# Patient Record
Sex: MALE | Race: WHITE | Employment: OTHER | URBAN - METROPOLITAN AREA
[De-identification: names, ages, dates, MRNs, and addresses within clinical notes are randomized per-mention and may not be internally consistent; named-entity substitution may affect disease eponyms.]

---

## 2017-05-12 ENCOUNTER — HOSPITAL ENCOUNTER (EMERGENCY)
Facility: CLINIC | Age: 32
Discharge: HOME | End: 2017-05-12
Attending: EMERGENCY MEDICINE | Admitting: EMERGENCY MEDICINE
Payer: MEDICARE

## 2017-05-12 VITALS
TEMPERATURE: 97.4 F | DIASTOLIC BLOOD PRESSURE: 93 MMHG | HEART RATE: 125 BPM | RESPIRATION RATE: 22 BRPM | SYSTOLIC BLOOD PRESSURE: 145 MMHG | OXYGEN SATURATION: 96 %

## 2017-05-12 DIAGNOSIS — H61.91 LESION OF RIGHT EXTERNAL EAR: ICD-10-CM

## 2017-05-12 DIAGNOSIS — L03.113 CELLULITIS OF RIGHT ARM: ICD-10-CM

## 2017-05-12 PROCEDURE — 99282 EMERGENCY DEPT VISIT SF MDM: CPT

## 2017-05-12 PROCEDURE — 00000146 ZZHCL STATISTIC GLUCOSE BY METER IP

## 2017-05-12 RX ORDER — SULFAMETHOXAZOLE/TRIMETHOPRIM 800-160 MG
1 TABLET ORAL 2 TIMES DAILY
Qty: 14 TABLET | Refills: 0 | Status: SHIPPED | OUTPATIENT
Start: 2017-05-12 | End: 2017-05-19

## 2017-05-12 RX ORDER — CEPHALEXIN 500 MG/1
500 CAPSULE ORAL 4 TIMES DAILY
Qty: 28 CAPSULE | Refills: 0 | Status: SHIPPED | OUTPATIENT
Start: 2017-05-12 | End: 2017-05-19

## 2017-05-12 ASSESSMENT — ENCOUNTER SYMPTOMS: WOUND: 1

## 2017-05-12 NOTE — ED AVS SNAPSHOT
[facility] Emergency Department    [address] [place] [address]    Phone:  [phone]    Fax:  [phone]                                       Rob Brady   MRN: [id number]    Department:  [facility] Emergency Department   Date of Visit:  5/12/2017           Patient Information     Date Of Birth          1985        Your diagnoses for this visit were:     Cellulitis of right arm     Lesion of right external ear        You were seen by Pamela Chicas MD.      Follow-up Information     Follow up with Clinic or emergency department.    Why:  If ear bump is not improving on antibiotics or if symptoms worsen        Discharge Instructions         Discharge Instructions  Cellulitis    Cellulitis is an infection of the skin that occurs when bacteria enter the skin.   Symptoms are generally redness, swelling, warmth and pain.  Your infection appeared to be appropriate to treat at home with antibiotics.  However, sometimes your infection may be worse than it seemed at first, or may worsen with time. If you have new or worse symptoms, you may need to be seen again in the Emergency Department or by your primary doctor.    Return to the Emergency Department if:    The redness, pain, or swelling gets a lot worse.  If the red area was marked, return if it is red beyond the marked area.    You are unable to get your antibiotics, or are vomiting them up or you can t take them.    You are feeling more ill, weak or lightheaded.    You start to run a new fever (temperature >101).    Anything else about the infection worries or concerns you.  Treatment:    Start your antibiotics right away, and take them as prescribed. Be sure to finish the whole prescription, even if you are better.    Apply a heating pad, warm packs, or warm water soaks to the infected area for 15 minutes at a time, at least 3 times a day. Do not use a heating pad on your feet or legs if you have  "diabetes. Do not sleep with a heating pad on, since this can cause burns or skin injury.    Rest your injured area for at least 1-2 days. After that you may start using your extremity again as long as there is not too much pain.     Raise the injured area above the level of your heart as much as possible in the first 1-2 days.    Tylenol  (acetaminophen), Motrin  (ibuprofen), or Advil  (ibuprofen) may help may help reduce pain and fever and may help you feel more comfortable. Be sure to read and follow the package directions, and ask your doctor if you have questions.    Follow-up with your doctor:    Re-check in clinic within 2-3 days.  Probiotics: If you have been given an antibiotic, you may want to also take a probiotic pill or eat yogurt with live cultures. Probiotics have \"good bacteria\" to help your intestines stay healthy. Studies have shown that probiotics help prevent diarrhea and other intestine problems (including C. diff infection) when you take antibiotics. You can buy these without a prescription in the pharmacy section of the store.     If you were given a prescription for medicine here today, be sure to read all of the information (including the package insert) that comes with your prescription.  This will include important information about the medicine, its side effects, and any warnings that you need to know about.  The pharmacist who fills the prescription can provide more information and answer questions you may have about the medicine.  If you have questions or concerns that the pharmacist cannot address, please call or return to the Emergency Department.       Remember that you can always come back to the Emergency Department if you are not able to see your regular doctor in the amount of time listed above, if you get any new symptoms, or if there is anything that worries you.        24 Hour Appointment Hotline       To make an appointment at any [facility], call [phone] ([phone]). If you don't have a family doctor or clinic, we will help you find one. [facility] clinics are conveniently located to serve the needs of you and your family.             Review of your medicines      START taking        Dose / Directions Last dose taken    cephALEXin 500 MG capsule   Commonly known as:  KEFLEX   Dose:  500 mg   Quantity:  28 capsule        Take 1 capsule (500 mg) by mouth 4 times daily for 7 days   Refills:  0        sulfamethoxazole-trimethoprim 800-160 MG per tablet   Commonly known as:  BACTRIM DS   Dose:  1 tablet   Quantity:  14 tablet        Take 1 tablet by mouth 2 times daily for 7 days   Refills:  0          Our records show that you are taking the medicines listed below. If these are incorrect, please call your family doctor or clinic.        Dose / Directions Last dose taken    ABILIFY 15 MG tablet   Dose:  15 mg   Generic drug:  ARIPiprazole        Take 15 mg by mouth daily.   Refills:  0        ACT FLUORIDE MT        Take  by mouth 2 times daily. Rinse Mouth Twice Daily.   Refills:  0        benztropine 0.5 MG tablet   Commonly known as:  COGENTIN   Dose:  0.5 mg        Take 0.5 mg by mouth 2 times daily.   Refills:  0        cholecalciferol 1000 UNITS Tabs   Dose:  2 tablet        Take 2 tablets by mouth daily.   Refills:  0        GLYCOLAX powder   Generic drug:  polyethylene glycol        as needed   Refills:  0        ketoconazole 2 % shampoo   Commonly known as:  NIZORAL   Quantity:  120 mL        Apply to the affected area and wash off after 5 minutes.   Refills:  0        metFORMIN 750 MG 24 hr tablet   Commonly known as:  GLUCOPHAGE-XR   Dose:  750 mg        Take 750 mg by mouth 2 times daily (before meals).   Refills:  0        TOPAMAX 200 MG tablet   Dose:  1 tablet   Generic drug:  topiramate        Take 1 tablet by mouth daily.   Refills:  0                Prescriptions were sent or printed at these locations (2 Prescriptions)                   Other  Prescriptions                Printed at Department/Unit printer (2 of 2)         cephALEXin (KEFLEX) 500 MG capsule               sulfamethoxazole-trimethoprim (BACTRIM DS) 800-160 MG per tablet                Orders Needing Specimen Collection     None      Pending Results     No orders found from 5/10/2017 to 5/13/2017.            Pending Culture Results     No orders found from 5/10/2017 to 5/13/2017.            Pending Results Instructions     If you had any lab results that were not finalized at the time of your Discharge, you can call the ED Lab Result RN at [phone]. You will be contacted by this team for any positive Lab results or changes in treatment. The nurses are available 7 days a week from 10A to 6:30P.  You can leave a message 24 hours per day and they will return your call.        Test Results From Your Hospital Stay               Clinical Quality Measure: Blood Pressure Screening     Your blood pressure was checked while you were in the emergency department today. The last reading we obtained was  BP: (!) 145/93 . Please read the guidelines below about what these numbers mean and what you should do about them.  If your systolic blood pressure (the top number) is less than 120 and your diastolic blood pressure (the bottom number) is less than 80, then your blood pressure is normal. There is nothing more that you need to do about it.  If your systolic blood pressure (the top number) is 120-139 or your diastolic blood pressure (the bottom number) is 80-89, your blood pressure may be higher than it should be. You should have your blood pressure rechecked within a year by a primary care provider.  If your systolic blood pressure (the top number) is 140 or greater or your diastolic blood pressure (the bottom number) is 90 or greater, you may have high blood pressure. High blood pressure is treatable, but if left untreated over time it can put you at risk for heart attack, stroke, or kidney failure.  "You should have your blood pressure rechecked by a primary care provider within the next 4 weeks.  If your provider in the emergency department today gave you specific instructions to follow-up with your doctor or provider even sooner than that, you should follow that instruction and not wait for up to 4 weeks for your follow-up visit.        Thank you for choosing [facility]       Thank you for choosing [facility] for your care. Our goal is always to provide you with excellent care. Hearing back from our patients is one way we can continue to improve our services. Please take a few minutes to complete the written survey that you may receive in the mail after you visit with us. Thank you!        [email or web address] Information     [employer] lets you send messages to your doctor, view your test results, renew your prescriptions, schedule appointments and more. To sign up, go to [email or web address] . Click on \"Log in\" on the left side of the screen, which will take you to the Welcome page. Then click on \"Sign up Now\" on the right side of the page.     You will be asked to enter the access code listed below, as well as some personal information. Please follow the directions to create your username and password.     Your access code is: [email or web address]  Expires: 8/10/2017  9:25 PM     Your access code will  in 90 days. If you need help or a new code, please call your [facility] clinic or [phone].        Care EveryWhere ID     This is your Care EveryWhere ID. This could be used by other organizations to access your [facility] medical records  [id number]        After Visit Summary       This is your record. Keep this with you and show to your community pharmacist(s) and doctor(s) at your next visit.                  "

## 2017-05-12 NOTE — ED AVS SNAPSHOT
[facility] Emergency Department    [address]    [facility] [address]    Phone:  [phone]    Fax:  [phone]                                       Rob Brady   MRN: [id number]    Department:  [facility] Emergency Department   Date of Visit:  5/12/2017           After Visit Summary Signature Page     I have received my discharge instructions, and my questions have been answered. I have discussed any challenges I see with this plan with the nurse or doctor.    ..........................................................................................................................................  Patient/Patient Representative Signature      ..........................................................................................................................................  Patient Representative Print Name and Relationship to Patient    ..................................................               ................................................  Date                                            Time    ..........................................................................................................................................  Reviewed by Signature/Title    ...................................................              ..............................................  Date                                                            Time

## 2017-05-13 LAB — GLUCOSE BLDC GLUCOMTR-MCNC: 125 MG/DL (ref 70–99)

## 2017-05-13 NOTE — ED PROVIDER NOTES
History     Chief Complaint:  Wound Infection    HPI   Rob Brady is a 31 year old male who presents to the emergency department today for evaluation of wound infection. The patient states he has a bump behind his right ear that is causing him some pain. Additionally, he has a wound on his right forearm that he notes he has been scratching at for a few weeks and thinks is infected.  No fevers.    Allergies:  No Known Drug Allergies     Medications:    Abilify  Buspar  Fluoxetine  Metformin  Omeprazole  Topamax  Trazodone  Ibuprofen  Glycolax  Prozac  Ambien  Ativan  Lexapro    Past Medical History:    ADD  Depressive disorder  Diabetes mellitus  OCD  Oppositional defiant disorder    Past Surgical History:    Dental surgery    Family History:    History reviewed. No pertinent family history.      Social History:  Smoking Status: former smoker   Alcohol Use: positive    Marital Status:  Single [1]     Review of Systems   Skin: Positive for wound.   All other systems reviewed and are negative.    Physical Exam   Vitals:  Patient Vitals for the past 24 hrs:   BP Temp Temp src Pulse Resp SpO2   05/12/17 2001 (!) 145/93 97.4  F (36.3  C) Oral 125 22 96 %      Physical Exam  Eyes:  Sclera white; Pupils are equal and round  ENT:   External ears and nares normal  CV:  Rate as above with regular rhythm   Resp:  Breath sounds clear and equal bilaterally  MS:  Moves all extremities  Skin:  Warm and dry  Small tender bump at reflection of posterior R ear and scalp, no cellulitis  Open wound R forearm w/surrounding cellulitis  Neuro: Speech is normal and fluent.    Emergency Department Course      Emergency Department Course:  Nursing notes and vitals reviewed.  I performed an exam of the patient as documented above.   I discussed the treatment plan with the patient. They expressed understanding of this plan and consented to discharge. They will be discharged home with instructions for care and follow up. In addition, the  patient will return to the emergency department if their symptoms persist, worsen, if new symptoms arise or if there is any concern.  All questions were answered.   I personally reviewed the results with the patient and answered all related questions prior to discharge.    Impression & Plan      Medical Decision Making:  The patient presents here for evaluation of two areas of concern on the skin. The right forearm is consistent with cellulitis around an open wound. There is no fluctuance to suggest abscess formation. Behind the right ear there is a small tender bump. This is mobile and not draining. There is no overlying evidence of cellulitis. This area is very small but may be early abscess. Based on small size, I think antibiotic treatment is appropriate and monitoring for enlarging or failure to resolve for which he should return immediately.  He has a history of diabetes and glucose is normal.    Diagnosis:    ICD-10-CM    1. Cellulitis of right arm L03.113    2. Lesion of right external ear H61.91      Disposition:   Discharge    Discharge Medications:  New Prescriptions    CEPHALEXIN (KEFLEX) 500 MG CAPSULE    Take 1 capsule (500 mg) by mouth 4 times daily for 7 days    SULFAMETHOXAZOLE-TRIMETHOPRIM (BACTRIM DS) 800-160 MG PER TABLET    Take 1 tablet by mouth 2 times daily for 7 days     Scribe Disclosure:  I, Sudarshan Wright, am serving as a scribe at 8:12 PM on 5/12/2017 to document services personally performed by Pamela Chicas, *, based on my observations and the provider's statements to me.   5/12/2017   [facility] EMERGENCY DEPARTMENT       Pamela Chicas MD  05/13/17 2761

## 2017-05-13 NOTE — DISCHARGE INSTRUCTIONS
Discharge Instructions  Cellulitis    Cellulitis is an infection of the skin that occurs when bacteria enter the skin.   Symptoms are generally redness, swelling, warmth and pain.  Your infection appeared to be appropriate to treat at home with antibiotics.  However, sometimes your infection may be worse than it seemed at first, or may worsen with time. If you have new or worse symptoms, you may need to be seen again in the Emergency Department or by your primary doctor.    Return to the Emergency Department if:    The redness, pain, or swelling gets a lot worse.  If the red area was marked, return if it is red beyond the marked area.    You are unable to get your antibiotics, or are vomiting them up or you can t take them.    You are feeling more ill, weak or lightheaded.    You start to run a new fever (temperature >101).    Anything else about the infection worries or concerns you.  Treatment:    Start your antibiotics right away, and take them as prescribed. Be sure to finish the whole prescription, even if you are better.    Apply a heating pad, warm packs, or warm water soaks to the infected area for 15 minutes at a time, at least 3 times a day. Do not use a heating pad on your feet or legs if you have diabetes. Do not sleep with a heating pad on, since this can cause burns or skin injury.    Rest your injured area for at least 1-2 days. After that you may start using your extremity again as long as there is not too much pain.     Raise the injured area above the level of your heart as much as possible in the first 1-2 days.    Tylenol  (acetaminophen), Motrin  (ibuprofen), or Advil  (ibuprofen) may help may help reduce pain and fever and may help you feel more comfortable. Be sure to read and follow the package directions, and ask your doctor if you have questions.    Follow-up with your doctor:    Re-check in clinic within 2-3 days.  Probiotics: If you have been given an antibiotic, you may want to also  "take a probiotic pill or eat yogurt with live cultures. Probiotics have \"good bacteria\" to help your intestines stay healthy. Studies have shown that probiotics help prevent diarrhea and other intestine problems (including C. diff infection) when you take antibiotics. You can buy these without a prescription in the pharmacy section of the store.     If you were given a prescription for medicine here today, be sure to read all of the information (including the package insert) that comes with your prescription.  This will include important information about the medicine, its side effects, and any warnings that you need to know about.  The pharmacist who fills the prescription can provide more information and answer questions you may have about the medicine.  If you have questions or concerns that the pharmacist cannot address, please call or return to the Emergency Department.       Remember that you can always come back to the Emergency Department if you are not able to see your regular doctor in the amount of time listed above, if you get any new symptoms, or if there is anything that worries you.      "

## 2017-05-13 NOTE — ED NOTES
Pt complains of a bump behind his right ear that hurts if he pushes on it.  Pt states he is a  due to his OCD and he has a wound on his right forearm that he thinks is infected.    Airway, breathing and circulation intact without need for intervention. Alert and interacting appropriately for age and situation.    HOME MEDICATIONS:  List in EPIC is NOT correct: verified as able, but pt does not know remainder of his meds. Goes to [facility] for medical care.

## 2017-10-01 ENCOUNTER — APPOINTMENT (OUTPATIENT)
Dept: GENERAL RADIOLOGY | Facility: CLINIC | Age: 32
End: 2017-10-01
Attending: EMERGENCY MEDICINE
Payer: MEDICARE

## 2017-10-01 ENCOUNTER — HOSPITAL ENCOUNTER (EMERGENCY)
Facility: CLINIC | Age: 32
Discharge: HOME | End: 2017-10-01
Attending: EMERGENCY MEDICINE | Admitting: EMERGENCY MEDICINE
Payer: MEDICARE

## 2017-10-01 VITALS
DIASTOLIC BLOOD PRESSURE: 83 MMHG | OXYGEN SATURATION: 94 % | RESPIRATION RATE: 20 BRPM | HEART RATE: 94 BPM | SYSTOLIC BLOOD PRESSURE: 122 MMHG | TEMPERATURE: 97.8 F | BODY MASS INDEX: 54.93 KG/M2 | WEIGHT: 315 LBS

## 2017-10-01 DIAGNOSIS — R05.9 COUGH: ICD-10-CM

## 2017-10-01 DIAGNOSIS — H66.91 RIGHT ACUTE OTITIS MEDIA: ICD-10-CM

## 2017-10-01 DIAGNOSIS — J06.9 UPPER RESPIRATORY TRACT INFECTION, UNSPECIFIED TYPE: ICD-10-CM

## 2017-10-01 PROCEDURE — 71020 XR CHEST 2 VW: CPT

## 2017-10-01 PROCEDURE — 99283 EMERGENCY DEPT VISIT LOW MDM: CPT | Mod: 25

## 2017-10-01 RX ORDER — AMOXICILLIN 500 MG/1
1000 CAPSULE ORAL 2 TIMES DAILY
Qty: 40 CAPSULE | Refills: 0 | Status: SHIPPED | OUTPATIENT
Start: 2017-10-01 | End: 2017-10-11

## 2017-10-01 NOTE — ED AVS SNAPSHOT
[facility] Emergency Department    [address]    [facility] [address]    Phone:  [phone]    Fax:  [phone]                                       Rob Brady   MRN: [id number]    Department:  [facility] Emergency Department   Date of Visit:  10/1/2017           Patient Information     Date Of Birth          1985        Your diagnoses for this visit were:     Right acute otitis media     Cough     Upper respiratory tract infection, unspecified type        You were seen by Mateo Clark MD.      Follow-up Information     Follow up with Sudarshan Jack MD.    Specialty:  Family Practice    Contact information:    [facility]  [address] [place] [id number] [phone]          Follow up with [facility] Emergency Department.    Specialty:  EMERGENCY MEDICINE    Why:  If symptoms worsen    Contact information:    [address]  [facility] [address]  [phone]        Discharge Instructions         Discharge Instructions  Otitis Media  You or your child have an ear infection known as acute otitis media, or middle ear infection (otitis = ear, media = middle). These infections often develop after a virus infection, such as a cold. The cold causes swelling around the pressure-equalizing tube of the ear, which allows fluid to build up in the space behind the eardrum (the middle ear). This fluid build-up can trap bacteria and viruses and increase pressure on the eardrum causing pain.  Return to the Emergency Department if:    You or your child are not better within 24-48 hours.    Your child becomes very fussy or weak.    Your child is showing signs of dehydration, such as less than 3 wet diapers per day.    Your symptoms get worse, or if you develop a severe headache, stiff neck, or new symptoms.    You have signs of allergic reaction to medicine. These include rash, lip swelling, difficulty breathing, wheezing, and  "dizziness.    Ear infection symptoms:     Symptoms of an ear infection can include ear aching or pain and temporary hearing loss. These symptoms often come on suddenly.    Ear infection symptoms (infants / young children):    Fever (temperature greater than 100.4 F or 38 C).    Pulling on the ear.    Fussiness.    Decreased activity.    Lack of appetite or difficulty eating.    Vomiting or diarrhea.    Treatment:     The \"best\" treatment depends on your age, history of previous infections, and any underlying medical problems.    Antibiotics are not given to every patient with an ear infection because studies show that many people with ear infections will improve without using antibiotics. Because antibiotics can have side effects such as diarrhea and stomach upset and can also cause severe allergic reactions, doctors are trying to avoid using antibiotics if it is safe for the patient to do so.   In these cases, a prescription for antibiotics may be given to be filled in 24 -48 hours if symptoms are getting worse or not improving. If the symptoms are improving, the antibiotic does not need to be taken.     Remember, antibiotics do not treat pain.      Pain medications. You may take a pain medication such as Tylenol  (acetaminophen), Advil  (ibuprofen), Nuprin  (ibuprofen) or Aleve  (naproxen).  If you have been given a narcotic such as Vicodin  (hydrocodone with acetaminophen), Percocet  (oxycodone with acetaminophen), or codeine, do not drive for four hours after you have taken it. If the narcotic contains Tylenol  (acetaminophen), do not take Tylenol  with it. All narcotics will cause constipation, so eat a high fiber diet.      Pain treatment options also include ear drops such as Auralgan  (antipyrine/benzocaine/u-polycosanol), which contains a topical numbing medicine.     Do not take a medication if you have a known allergy to that medication.  Probiotics: If you have been given an antibiotic, you may want to " "also take a probiotic pill or eat yogurt with live cultures. Probiotics have \"good bacteria\" to help your intestines stay healthy. Studies have shown that probiotics help prevent diarrhea and other intestine problems (including C. diff infection) when you take antibiotics. You can buy these without a prescription in the pharmacy section of the store.   Complications:      Tympanic membrane rupture - One possible complication of an ear infection is rupture of the tympanic membrane, or ear drum. This happens because of pressure on the tympanic membrane from the infected fluid. When the tympanic membrane ruptures, you may have pus or blood drain from the ear. It does not hurt when the membrane ruptures, and many people actually feel better because pressure is released. Fortunately, the tympanic membrane usually heals quickly after rupturing, within hours to days. You should keep water out of the ear until you re-check with your doctor to be sure the ear drum has healed.       Mastoiditis - Rarely, the area behind the ear can become infected, this area is called the mastoid.  If you notice redness and swelling behind your ear, see your physician or return to the Emergency Department immediately.        Hearing loss - The fluid that collects behind the eardrum (called an effusion) can persist for weeks to months after the pain of an ear infection resolves. An effusion causes trouble hearing, which is usually temporary. If the fluid persists, however, it can interfere with the process of learning to speak.   For this reason, children under 2 need to be seen by their pediatrician WITHIN 3 MONTHS to ensure that the fluid has been reabsorbed.  If you were given a prescription for medicine here today, be sure to read all of the information (including the package insert) that comes with your prescription.  This will include important information about the medicine, its side effects, and any warnings that you need to know " about.  The pharmacist who fills the prescription can provide more information and answer questions you may have about the medicine.  If you have questions or concerns that the pharmacist cannot address, please call or return to the Emergency Department.   Opioid Medication Information    Pain medications are among the most commonly prescribed medicines, so we are including this information for all our patients. If you did not receive pain medication or get a prescription for pain medicine, you can ignore it.     You may have been given a prescription for an opioid (narcotic) pain medicine and/or have received a pain medicine while here in the Emergency Department. These medicines can make you drowsy or impaired. You must not drive, operate dangerous equipment, or engage in any other dangerous activities while taking these medications. If you drive while taking these medications, you could be arrested for DUI, or driving under the influence. Do not drink any alcohol while you are taking these medications.     Opioid pain medications can cause addiction. If you have a history of chemical dependency of any type, you are at a higher risk of becoming addicted to pain medications.  Only take these prescribed medications to treat your pain when all other options have been tried. Take it for as short a time and as few doses as possible. Store your pain pills in a secure place, as they are frequently stolen and provide a dangerous opportunity for children or visitors in your house to start abusing these powerful medications. We will not replace any lost or stolen medicine.  As soon as your pain is better, you should flush all your remaining medication.     Many prescription pain medications contain Tylenol  (acetaminophen), including Vicodin , Tylenol #3 , Norco , Lortab , and Percocet .  You should not take any extra pills of Tylenol  if you are using these prescription medications or you can get very sick.  Do not ever  take more than 3000 mg of acetaminophen in any 24 hour period.    All opioids tend to cause constipation. Drink plenty of water and eat foods that have a lot of fiber, such as fruits, vegetables, prune juice, apple juice and high fiber cereal.  Take a laxative if you don t move your bowels at least every other day. Miralax , Milk of Magnesia, Colace , or Senna  can be used to keep you regular.      Remember that you can always come back to the Emergency Department if you are not able to see your regular doctor in the amount of time listed above, if you get any new symptoms, or if there is anything that worries you.        24 Hour Appointment Hotline       To make an appointment at any [facility], call [phone] ([phone]). If you don't have a family doctor or clinic, we will help you find one. [facility] are conveniently located to serve the needs of you and your family.             Review of your medicines      START taking        Dose / Directions Last dose taken    amoxicillin 500 MG capsule   Commonly known as:  AMOXIL   Dose:  1000 mg   Quantity:  40 capsule        Take 2 capsules (1,000 mg) by mouth 2 times daily for 10 days   Refills:  0          Our records show that you are taking the medicines listed below. If these are incorrect, please call your family doctor or clinic.        Dose / Directions Last dose taken    ABILIFY 15 MG tablet   Dose:  15 mg   Generic drug:  ARIPiprazole        Take 15 mg by mouth daily.   Refills:  0        ACT FLUORIDE MT        Take  by mouth 2 times daily. Rinse Mouth Twice Daily.   Refills:  0        benztropine 0.5 MG tablet   Commonly known as:  COGENTIN   Dose:  0.5 mg        Take 0.5 mg by mouth 2 times daily.   Refills:  0        cholecalciferol 1000 UNITS Tabs   Dose:  2 tablet        Take 2 tablets by mouth daily.   Refills:  0        GLYCOLAX powder   Generic drug:  polyethylene glycol        as needed   Refills:  0        ketoconazole 2 %  shampoo   Commonly known as:  NIZORAL   Quantity:  120 mL        Apply to the affected area and wash off after 5 minutes.   Refills:  0        metFORMIN 750 MG 24 hr tablet   Commonly known as:  GLUCOPHAGE-XR   Dose:  750 mg        Take 750 mg by mouth 2 times daily (before meals).   Refills:  0        TOPAMAX 200 MG tablet   Dose:  1 tablet   Generic drug:  topiramate        Take 1 tablet by mouth daily.   Refills:  0                Prescriptions were sent or printed at these locations (1 Prescription)                   Other Prescriptions                Printed at Department/Unit printer (1 of 1)         amoxicillin (AMOXIL) 500 MG capsule                Procedures and tests performed during your visit     Chest XR,  PA & LAT      Orders Needing Specimen Collection     None      Pending Results     No orders found from 9/29/2017 to 10/2/2017.            Pending Culture Results     No orders found from 9/29/2017 to 10/2/2017.            Pending Results Instructions     If you had any lab results that were not finalized at the time of your Discharge, you can call the ED Lab Result RN at [phone]. You will be contacted by this team for any positive Lab results or changes in treatment. The nurses are available 7 days a week from 10A to 6:30P.  You can leave a message 24 hours per day and they will return your call.        Test Results From Your Hospital Stay        10/1/2017  4:14 PM      Narrative     CHEST TWO VIEWS    10/1/2017 4:02 PM     HISTORY: Cough.    COMPARISON: 11/16/2012.    FINDINGS: Negative chest. No interval change.        Impression     IMPRESSION: Negative.     KIRSTIN TOMLINSON MD                Clinical Quality Measure: Blood Pressure Screening     Your blood pressure was checked while you were in the emergency department today. The last reading we obtained was  BP: 124/73 . Please read the guidelines below about what these numbers mean and what you should do about them.  If your systolic blood  "pressure (the top number) is less than 120 and your diastolic blood pressure (the bottom number) is less than 80, then your blood pressure is normal. There is nothing more that you need to do about it.  If your systolic blood pressure (the top number) is 120-139 or your diastolic blood pressure (the bottom number) is 80-89, your blood pressure may be higher than it should be. You should have your blood pressure rechecked within a year by a primary care provider.  If your systolic blood pressure (the top number) is 140 or greater or your diastolic blood pressure (the bottom number) is 90 or greater, you may have high blood pressure. High blood pressure is treatable, but if left untreated over time it can put you at risk for heart attack, stroke, or kidney failure. You should have your blood pressure rechecked by a primary care provider within the next 4 weeks.  If your provider in the emergency department today gave you specific instructions to follow-up with your doctor or provider even sooner than that, you should follow that instruction and not wait for up to 4 weeks for your follow-up visit.        Thank you for choosing [facility]       Thank you for choosing [facility] for your care. Our goal is always to provide you with excellent care. Hearing back from our patients is one way we can continue to improve our services. Please take a few minutes to complete the written survey that you may receive in the mail after you visit with us. Thank you!        [email or web address] Information     [employer] lets you send messages to your doctor, view your test results, renew your prescriptions, schedule appointments and more. To sign up, go to [email or web address] . Click on \"Log in\" on the left side of the screen, which will take you to the Welcome page. Then click on \"Sign up Now\" on the right side of the page.     You will be asked to enter the access code listed below, as well as some personal information. Please follow the " directions to create your username and password.     Your access code is: [email or web address]  Expires: 2017  4:32 PM     Your access code will  in 90 days. If you need help or a new code, please call your [facility] clinic or [phone].        Care EveryWhere ID     This is your Care EveryWhere ID. This could be used by other organizations to access your [facility] medical records  [id number]        Equal Access to Services     [facility] : Hadii aad ku hadasho Soarchie, waaxda luqadaha, qaybta kaalmada adeegyada, jayne monzon . So [facility] [phone].    ATENCIÓN: Si habla español, tiene a angeles disposición servicios gratuitos de asistencia lingüística. Llame al [phone].    We comply with applicable federal civil rights laws and [place] laws. We do not discriminate on the basis of race, color, national origin, age, disability, sex, sexual orientation, or gender identity.            After Visit Summary       This is your record. Keep this with you and show to your community pharmacist(s) and doctor(s) at your next visit.

## 2017-10-01 NOTE — ED PROVIDER NOTES
History     Chief Complaint:  Nasal Congestion and Cough       HPI:  Rob Brady is a 32 year old male who presents with cough and nasal congestion. Patient notes symptoms having been present for 2 weeks. Cough has been productive of sputum. Associated symptoms include rhinorrhea, nasal congestion and right ear pain. He denies any known sick contacts. No travel outside the state or country. Patient is a former smoker. No history of underlying asthma or other pulmonary disease. Denies abdominal pain, nausea, vomiting, or diarrhea. He has been eating and drinking well. He presents alongside his mother. No other concerns or voiced at this time.    Allergies:  Nkda [No Known Drug Allergies]     Medications:      amoxicillin (AMOXIL) 500 MG capsule   ketoconazole (NIZORAL) 2 % shampoo   cholecalciferol 1000 UNITS TABS   ARIPiprazole (ABILIFY) 15 MG tablet   benztropine (COGENTIN) 0.5 MG tablet   MetFORmin (GLUCOPHAGE-XR) 750 MG 24 hr tablet   ACT FLUORIDE MT   GLYCOLAX OR POWD   TOPAMAX 200 MG OR TABS       Past Medical History:    Past Medical History:   Diagnosis Date     ADD (attention deficit disorder)      Depressive disorder      Diabetes mellitus (H)      OCD (obsessive compulsive disorder)      Oppositional defiant disorder      Patient Active Problem List    Diagnosis Date Noted     Depression 10/09/2012     Priority: Medium        Past Surgical History:    Past Surgical History:   Procedure Laterality Date     BIOPSY       DENTAL SURGERY  2006        Family History:    family history is not on file.    Social History:   reports that he quit smoking about 11 years ago. He has never used smokeless tobacco. He reports that he drinks alcohol. He reports that he does not use illicit drugs.    Review of Systems:  10 point ROS is negative aside from that mentioned in the HPI      Physical Exam     Patient Vitals for the past 24 hrs:   BP Temp Temp src Pulse Resp SpO2 Weight   10/01/17 1617 - - - 94 - 95 % -    10/01/17 1530 124/73 - - - - 94 % -   10/01/17 1515 - - - - - 94 % -   10/01/17 1505 131/78 97.8  F (36.6  C) Oral 106 20 96 % (!) 145.2 kg (320 lb)   10/01/17 1500 - - - - - 94 % -      General:   Well-nourished   Speaking in full sentences  Eyes:   Conjunctiva without injection or scleral icterus   PERRL  ENT:   Moist mucous membranes   Posterior oropharynx clear without erythema or exudate   Nasal congestion appreciated   Dried rhinorrhea   TM on R with obscuration of landmarks, purulent fluid behind TM, light reflex blunted   L TM with erythema though landmarks visualized   Nares patent   Pinnae normal  Neck:   Full ROM   No stiffness appreciated  Resp:   Lungs CTAB   No crackles, wheezing or audible rubs   Good air movement  CV:    Tachycardic rate, regular rhythm   S1 and S2 present   No murmur, gallop or rub  GI:   BS present   Abdomen soft without distention   Non-tender to light and deep palpation   No guarding or rebound tenderness  Skin:   Warm, dry, well perfused   No rashes or open wounds on exposed skin  MSK:   Moves all extremities   No focal deformities or swelling  Neuro:   Alert   Answers questions appropriately   Moves all extremities equally   Gait stable  Psych:   Normal affect, normal mood      Emergency Department Course     Imaging:  Radiology findings were communicated with the patient who voiced understanding of the findings.  Chest XR,  PA & LAT   Final Result   IMPRESSION: Negative.       KIRSTIN TOMLINSON MD         Procedures:  Cerumen disempaction    Interventions:  Medications - No data to display     Emergency Department Course:  Nursing notes and vitals reviewed.  3:10 PM: I performed an exam of the patient as documented above.      IV was inserted and blood was drawn for laboratory testing, results above.    The patient was sent for X-ray while in the emergency department, results above.      3:59 PM:  Patient re-evaluated.     4:32 PMI discussed the treatment plan with the  patient and mother. They expressed understanding of this plan and consented to discharge. They will be discharged home with instructions for care and follow up. In addition, the patient will return to the emergency department if their symptoms persist, worsen, if new symptoms arise or if there is any concern.  All questions were answered.    I personally reviewed the imaging and laboratory results with the Patient and answered all related questions prior to discharge.    Impression & Plan    Medical Decision Making:  Rob Brady is a 32 year old male presenting to the ER for evaluation of otalgia.  VS on presentation reveal elevated BP and HR, which improved during ED course.  History and examination is consistent with right otitis media.  There is no appreciable swelling of the external canal, drainage, nor pain with manipulation of the pinnae to suggest otitis externa.  No erythema or tenderness to palpation of the mastoid process to suggest mastoiditis.  Patient is awake, alert and history and exam is not consistent with acute meningitis/encephalitis.  He notes associated cough, productive of sputum, though CXR negative for pneumonia.  Additionally, there is no current evidence to suggest deep space neck infection such as peritonsillar abscess, retropharyngeal abscess, soft tissue abscess, epiglottitis, nor Julius's angina. On exam, he does not appear clinically dehydrated to warrant IVF.      Based on the above history, examination, and ED course, I feel patient is safe for DC home with close outpatient follow-up.  Patient will be started on Amoxicillin for treatment.  They were encouraged to use acetaminophen or ibuprofen for pain or fever.  I have recommended follow-up with their primary care provider in 3 days if symptoms are not improving.  They were welcomed to return to the ER with worsening pain, changes in behavior, vomiting, temperature >102F, or any other new or troubling symptoms.  Questions were  answered prior to discharge.      Diagnosis:    ICD-10-CM    1. Right acute otitis media H66.91    2. Cough R05    3. Upper respiratory tract infection, unspecified type J06.9         Discharge Medications:  New Prescriptions    AMOXICILLIN (AMOXIL) 500 MG CAPSULE    Take 2 capsules (1,000 mg) by mouth 2 times daily for 10 days        10/1/2017   Mateo Clark MD Roach, Brian Donald, MD  10/01/17 1633

## 2017-10-01 NOTE — ED AVS SNAPSHOT
[facility] Emergency Department    [address]    [facility] [address]    Phone:  [phone]    Fax:  [phone]                                       Rob Brady   MRN: [id number]    Department:  [facility] Emergency Department   Date of Visit:  10/1/2017           After Visit Summary Signature Page     I have received my discharge instructions, and my questions have been answered. I have discussed any challenges I see with this plan with the nurse or doctor.    ..........................................................................................................................................  Patient/Patient Representative Signature      ..........................................................................................................................................  Patient Representative Print Name and Relationship to Patient    ..................................................               ................................................  Date                                            Time    ..........................................................................................................................................  Reviewed by Signature/Title    ...................................................              ..............................................  Date                                                            Time

## 2019-01-12 ENCOUNTER — APPOINTMENT (OUTPATIENT)
Dept: GENERAL RADIOLOGY | Facility: CLINIC | Age: 34
End: 2019-01-12
Payer: MEDICARE

## 2019-01-12 ENCOUNTER — HOSPITAL ENCOUNTER (EMERGENCY)
Facility: CLINIC | Age: 34
Discharge: HOME | End: 2019-01-12
Attending: EMERGENCY MEDICINE | Admitting: EMERGENCY MEDICINE
Payer: MEDICARE

## 2019-01-12 VITALS
OXYGEN SATURATION: 99 % | RESPIRATION RATE: 18 BRPM | HEART RATE: 99 BPM | WEIGHT: 315 LBS | DIASTOLIC BLOOD PRESSURE: 70 MMHG | SYSTOLIC BLOOD PRESSURE: 132 MMHG | TEMPERATURE: 98.2 F | BODY MASS INDEX: 54.93 KG/M2

## 2019-01-12 DIAGNOSIS — R07.9 ACUTE CHEST PAIN: ICD-10-CM

## 2019-01-12 LAB
ANION GAP SERPL CALCULATED.3IONS-SCNC: 8 MMOL/L (ref 3–14)
BASOPHILS # BLD AUTO: 0.1 10E9/L (ref 0–0.2)
BASOPHILS NFR BLD AUTO: 0.8 %
BUN SERPL-MCNC: 11 MG/DL (ref 7–30)
CALCIUM SERPL-MCNC: 8 MG/DL (ref 8.5–10.1)
CHLORIDE SERPL-SCNC: 111 MMOL/L (ref 94–109)
CO2 SERPL-SCNC: 24 MMOL/L (ref 20–32)
CREAT SERPL-MCNC: 0.92 MG/DL (ref 0.66–1.25)
D DIMER PPP FEU-MCNC: 0.3 UG/ML FEU (ref 0–0.5)
DIFFERENTIAL METHOD BLD: ABNORMAL
EOSINOPHIL # BLD AUTO: 0.4 10E9/L (ref 0–0.7)
EOSINOPHIL NFR BLD AUTO: 3.1 %
ERYTHROCYTE [DISTWIDTH] IN BLOOD BY AUTOMATED COUNT: 13.2 % (ref 10–15)
GFR SERPL CREATININE-BSD FRML MDRD: >90 ML/MIN/{1.73_M2}
GLUCOSE SERPL-MCNC: 137 MG/DL (ref 70–99)
HCT VFR BLD AUTO: 42.1 % (ref 40–53)
HGB BLD-MCNC: 13.8 G/DL (ref 13.3–17.7)
IMM GRANULOCYTES # BLD: 0.1 10E9/L (ref 0–0.4)
IMM GRANULOCYTES NFR BLD: 0.5 %
LYMPHOCYTES # BLD AUTO: 4 10E9/L (ref 0.8–5.3)
LYMPHOCYTES NFR BLD AUTO: 31.8 %
MCH RBC QN AUTO: 29.1 PG (ref 26.5–33)
MCHC RBC AUTO-ENTMCNC: 32.8 G/DL (ref 31.5–36.5)
MCV RBC AUTO: 89 FL (ref 78–100)
MONOCYTES # BLD AUTO: 1.2 10E9/L (ref 0–1.3)
MONOCYTES NFR BLD AUTO: 9.6 %
NEUTROPHILS # BLD AUTO: 6.7 10E9/L (ref 1.6–8.3)
NEUTROPHILS NFR BLD AUTO: 54.2 %
NRBC # BLD AUTO: 0 10*3/UL
NRBC BLD AUTO-RTO: 0 /100
PLATELET # BLD AUTO: 246 10E9/L (ref 150–450)
POTASSIUM SERPL-SCNC: 3.4 MMOL/L (ref 3.4–5.3)
RBC # BLD AUTO: 4.74 10E12/L (ref 4.4–5.9)
SODIUM SERPL-SCNC: 143 MMOL/L (ref 133–144)
TROPONIN I SERPL-MCNC: <0.015 UG/L (ref 0–0.04)
WBC # BLD AUTO: 12.4 10E9/L (ref 4–11)

## 2019-01-12 PROCEDURE — 93005 ELECTROCARDIOGRAM TRACING: CPT

## 2019-01-12 PROCEDURE — 25000128 H RX IP 250 OP 636: Performed by: EMERGENCY MEDICINE

## 2019-01-12 PROCEDURE — 85025 COMPLETE CBC W/AUTO DIFF WBC: CPT | Performed by: EMERGENCY MEDICINE

## 2019-01-12 PROCEDURE — 84484 ASSAY OF TROPONIN QUANT: CPT | Performed by: EMERGENCY MEDICINE

## 2019-01-12 PROCEDURE — 80048 BASIC METABOLIC PNL TOTAL CA: CPT | Performed by: EMERGENCY MEDICINE

## 2019-01-12 PROCEDURE — 99285 EMERGENCY DEPT VISIT HI MDM: CPT | Mod: 25

## 2019-01-12 PROCEDURE — 96360 HYDRATION IV INFUSION INIT: CPT

## 2019-01-12 PROCEDURE — 85379 FIBRIN DEGRADATION QUANT: CPT | Performed by: EMERGENCY MEDICINE

## 2019-01-12 PROCEDURE — 71046 X-RAY EXAM CHEST 2 VIEWS: CPT

## 2019-01-12 RX ADMIN — SODIUM CHLORIDE 1000 ML: 9 INJECTION, SOLUTION INTRAVENOUS at 20:29

## 2019-01-12 ASSESSMENT — ENCOUNTER SYMPTOMS
FEVER: 0
RHINORRHEA: 1
COUGH: 1

## 2019-01-12 NOTE — ED AVS SNAPSHOT
[facility] Emergency Department  [address]  [facility] [address]  Phone:  [phone]  Fax:  [phone]                                    Rob Brady   MRN: [id number]    Department:  [facility] Emergency Department   Date of Visit:  1/12/2019           After Visit Summary Signature Page    I have received my discharge instructions, and my questions have been answered. I have discussed any challenges I see with this plan with the nurse or doctor.    ..........................................................................................................................................  Patient/Patient Representative Signature      ..........................................................................................................................................  Patient Representative Print Name and Relationship to Patient    ..................................................               ................................................  Date                                   Time    ..........................................................................................................................................  Reviewed by Signature/Title    ...................................................              ..............................................  Date                                               Time          22EPIC Rev 08/18

## 2019-01-13 LAB — INTERPRETATION ECG - MUSE: NORMAL

## 2019-01-13 NOTE — ED PROVIDER NOTES
"    History     Chief Complaint:  Chest Pain    HPI   Rob Brady is a 33 year old male with history of anxiety, ADD, depression, diabetes, and OCD, who presents for evaluation of chest pain. His chest pain began while he was playing bingo, just prior to arrival. Pain is located to his left shoulder, and left upper chest. He presents to the ED by ambulance with his mother. EMS administered 1 nitroglycerin, and 4 baby aspirin en route. No exacerbation of pain with deep inspiration. He states he has had chest pain \"a lot of times\" but today's discomfort feels dissimilar from these previous events. Denies exertional chest pain, or change in his discomfort with exertion. He states he has had recent increase in his stress as he is going through change in guardianship. He has had a productive cough for the past two weeks, and runny nose. No fever.     Cardiac/PE/DVT Risk Factors:  History of hypertension - No  History of hyperlipidemia - No  History of diabetes - Yes  History of smoking - Former  Personal history of PE/DVT - No  Family history of PE/DVT - No  Family history of heart complications - No  Recent travel - No  Recent surgery - No  Other immobilizations - No  Cancer - No    Allergies:  No Known Drug Allergies      Medications:    ARIPiprazole (ABILIFY) 15 MG tablet  benztropine (COGENTIN) 0.5 MG tablet  cholecalciferol 1000 UNITS TABS  MetFORmin (GLUCOPHAGE-XR) 750 MG 24 hr tablet  TOPAMAX 200 MG OR TABS     Past Medical History:    ADD (attention deficit disorder)   Depressive disorder  Diabetes mellitus  OCD (obsessive-compulsive disorder)  Oppositional defiant disorder    Past Surgical History:    Biopsy   Dental surgery    Family History:    No past pertinent family history.     Social History:  Relationship status: Single  Tobacco use: Former  Alcohol use: Yes  The patient presents via ambulance.    Marital Status:  Single [1]     Review of Systems   Constitutional: Negative for fever.   HENT: Positive " for congestion and rhinorrhea.    Respiratory: Positive for cough.    Cardiovascular: Positive for chest pain.   All other systems reviewed and are negative.      Physical Exam     Patient Vitals for the past 24 hrs:   BP Temp Temp src Pulse Resp SpO2 Weight   01/12/19 2013 135/78 98.2  F (36.8  C) Oral 103 18 99 % 145.2 kg (320 lb)        Physical Exam  General: Patient is alert and interactive when I enter the room  Head:  The scalp, face, and head appear normal  Eyes:  The pupils are equal, round, and reactive to light    Conjunctivae and sclerae are normal  ENT:    External acoustic canals are normal    The oropharynx is normal without erythema.     Uvula is in the midline  Neck:  Normal range of motion  CV:  Regular rate. S1/S2. No murmurs.   Resp:  Lungs are clear without wheezes or rales. No distress  GI:  Abdomen is soft, no rigidity, guarding, or rebound    No distension. No tenderness to palpation in any quadrant.     MS:  Normal tone. Joints grossly normal without effusions.     No asymmetric leg swelling, calf or thigh tenderness.      Normal motor assessment of all extremities.  Skin:  No rash or lesions noted. Normal capillary refill noted  Neuro: Speech is normal and fluent. Face is symmetric.     Moving all extremities well.   Psych:  Awake. Alert.  Normal affect.  Appropriate interactions.  Lymph: No anterior cervical lymphadenopathy noted        Emergency Department Course     ECG:  Indication: Chest pain  Completed at 2014.  Read at 2016.   Rate 102 bpm. AZ interval 140. QRS duration 92. QT/QTc 348/453. P-R-T axes 37  72  48.  Sinus tachycardia. Otherwise normal ECG.     Imaging:  Radiology findings were communicated with the patient and family who voiced understanding of the findings.  XR Chest 2 Views   Preliminary Result   IMPRESSION: No acute cardiopulmonary disease.        Laboratory:  Laboratory findings were communicated with the patient and family who voiced understanding of the  findings.    CBC: WBC: 12.4(H) o/w WNL (HGB 13.8, )  BMP: Chloride: 111(H), Glucose: 137(H), Calcium: 8.0(L) o/w WNL (Creatinine 0.92)   D Dimer (Collected 2025): 0.3   Troponin (Collected 2025): <0.015     Interventions:  2029 Normal Saline 1000 mL IV     Emergency Department Course:  Nursing notes and vitals reviewed.  EKG obtained in the ED, see results above.    IV was inserted and blood was drawn for laboratory testing, results above.   The patient was sent for a XR while in the emergency department, results above.      2016 I performed an exam of the patient as documented above.  2138 I updated the patient and his mother.      Findings and plan explained to the patient. Patient discharged home with instructions regarding supportive care, medications, and reasons to return. The importance of close follow-up was reviewed.      I personally reviewed the laboratory results with the patient and answered all related questions prior to discharge.    Impression & Plan      Medical Decision Making:  Rob Brady is a 33 year old male who presents with chest pain.  The work up in the Emergency Department is negative.  I considered a broad differential diagnosis in this patient including life-threatening etiologies such as acute coronary syndrome, myocardial infarction, pulmonary embolism, acute aortic dissection, myocarditis, pericarditis, acute valvular insufficiency amongst others.  Other causes considered for this patient included pneumonia, pneumothorax, chest wall source, pericarditis, pleurisy, esophageal spasm, etc.  No serious etiology for the chest pain were detected today during this visit.  Close follow up with primary care is indicated should the pain continue, as further work up may be performed; this was made clear to the patient, who understands.       Diagnosis:    ICD-10-CM    1. Acute chest pain R07.9        Disposition:   Discharged to home     Scribe Disclosure:  Jessica JALLOH am  serving as a scribe at 8:13 PM on 1/12/2019 to document services personally performed by Nitin Renee MD, based on my observations and the provider's statements to me.     Jessica Hancock  1/12/2019   [facility] EMERGENCY DEPARTMENT       Nitin Renee MD  01/12/19 2963

## 2019-03-20 ENCOUNTER — OFFICE VISIT (OUTPATIENT)
Dept: URGENT CARE | Facility: URGENT CARE | Age: 34
End: 2019-03-20
Payer: MEDICARE

## 2019-03-20 VITALS
SYSTOLIC BLOOD PRESSURE: 161 MMHG | RESPIRATION RATE: 19 BRPM | TEMPERATURE: 98.5 F | HEART RATE: 115 BPM | OXYGEN SATURATION: 94 % | DIASTOLIC BLOOD PRESSURE: 95 MMHG

## 2019-03-20 DIAGNOSIS — R50.9 FEVER, UNSPECIFIED FEVER CAUSE: ICD-10-CM

## 2019-03-20 DIAGNOSIS — R07.0 THROAT PAIN: ICD-10-CM

## 2019-03-20 DIAGNOSIS — J06.9 VIRAL UPPER RESPIRATORY TRACT INFECTION: Primary | ICD-10-CM

## 2019-03-20 LAB
DEPRECATED S PYO AG THROAT QL EIA: NORMAL
FLUAV+FLUBV AG SPEC QL: NEGATIVE
FLUAV+FLUBV AG SPEC QL: NEGATIVE
SPECIMEN SOURCE: NORMAL
SPECIMEN SOURCE: NORMAL

## 2019-03-20 PROCEDURE — 87880 STREP A ASSAY W/OPTIC: CPT | Performed by: PHYSICIAN ASSISTANT

## 2019-03-20 PROCEDURE — 87081 CULTURE SCREEN ONLY: CPT | Performed by: PHYSICIAN ASSISTANT

## 2019-03-20 PROCEDURE — 99203 OFFICE O/P NEW LOW 30 MIN: CPT | Performed by: PHYSICIAN ASSISTANT

## 2019-03-20 PROCEDURE — 87804 INFLUENZA ASSAY W/OPTIC: CPT | Performed by: PHYSICIAN ASSISTANT

## 2019-03-20 RX ORDER — LAMOTRIGINE 25 MG/1
50 TABLET ORAL
COMMUNITY

## 2019-03-20 RX ORDER — OXYBUTYNIN CHLORIDE 10 MG/1
10 TABLET, EXTENDED RELEASE ORAL
COMMUNITY

## 2019-03-20 RX ORDER — ACETAMINOPHEN 500 MG
500-1000 TABLET ORAL EVERY 6 HOURS PRN
COMMUNITY

## 2019-03-20 RX ORDER — ONDANSETRON 4 MG/1
4 TABLET, FILM COATED ORAL
COMMUNITY

## 2019-03-20 RX ORDER — ALBUTEROL SULFATE 90 UG/1
2 AEROSOL, METERED RESPIRATORY (INHALATION)
COMMUNITY
Start: 2012-06-18

## 2019-03-20 RX ORDER — TRAZODONE HYDROCHLORIDE 100 MG/1
TABLET ORAL
Refills: 2 | COMMUNITY
Start: 2018-12-29

## 2019-03-20 RX ORDER — ESCITALOPRAM OXALATE 20 MG/1
TABLET ORAL
COMMUNITY

## 2019-03-20 RX ORDER — LAMOTRIGINE 150 MG/1
150 TABLET ORAL
COMMUNITY

## 2019-03-20 RX ORDER — LOPERAMIDE HCL 2 MG
2 CAPSULE ORAL 4 TIMES DAILY PRN
COMMUNITY

## 2019-03-20 RX ORDER — TOPIRAMATE 100 MG/1
100 TABLET, FILM COATED ORAL
COMMUNITY
Start: 2018-08-02

## 2019-03-20 RX ORDER — ZIPRASIDONE HYDROCHLORIDE 40 MG/1
CAPSULE ORAL
Refills: 2 | COMMUNITY
Start: 2018-12-29

## 2019-03-20 ASSESSMENT — ENCOUNTER SYMPTOMS
ABDOMINAL PAIN: 1
COUGH: 1
SINUS PAIN: 0
CHEST TIGHTNESS: 1
SORE THROAT: 1
SHORTNESS OF BREATH: 0
FEVER: 0
WHEEZING: 0

## 2019-03-20 NOTE — LETTER
3/22/2019         Rob Brady  [address] [facility] [address]        Dear Rob:    Thank you for allowing me to participate in your care. Your recent test results were reviewed and listed below.        Results are normal- negative throat culture.        Thank you for choosing [facility]. As a result, please continue with the treatment plan discussed in the office. Return as discussed or sooner if symptoms worsens or fail to improve. If you have any further questions or concerns, please do not hesitate to contact us.      Sincerely,    Mireya Page PA-C    [facility]  [address]  [facility] [address]  Phone: [phone]

## 2019-03-21 LAB
BACTERIA SPEC CULT: NORMAL
SPECIMEN SOURCE: NORMAL

## 2019-03-21 NOTE — PROGRESS NOTES
SUBJECTIVE:   Rob Brady is a 33 year old male presenting with a chief complaint of   Chief Complaint   Patient presents with     URI     sore throat, cough, stomach       He is a new patient of [facility].    URI Adult    Onset of symptoms was 2 day(s) ago.  Course of illness is same.    Severity moderate  Current and Associated symptoms: cough - non-productive and sore throat  Treatment measures tried include None tried.  Predisposing factors include None.        Review of Systems   Constitutional: Negative for fever.   HENT: Positive for sore throat. Negative for congestion, ear pain and sinus pain.    Respiratory: Positive for cough and chest tightness. Negative for shortness of breath and wheezing.    Cardiovascular: Negative for chest pain.   Gastrointestinal: Positive for abdominal pain.        Mild upset stomach         Past Medical History:   Diagnosis Date     ADD (attention deficit disorder)      Depressive disorder      Diabetes mellitus (H)      OCD (obsessive compulsive disorder)      Oppositional defiant disorder      History reviewed. No pertinent family history.  Current Outpatient Medications   Medication Sig Dispense Refill     acetaminophen (TYLENOL) 500 MG tablet Take 500-1,000 mg by mouth every 6 hours as needed for mild pain       ACT FLUORIDE MT Take  by mouth 2 times daily. Rinse Mouth Twice Daily.       albuterol (PROVENTIL HFA) 108 (90 Base) MCG/ACT inhaler Inhale 2 puffs into the lungs       cholecalciferol (VITAMIN D-1000 MAX ST) 1000 units TABS Take 2,000 Units by mouth       cholecalciferol 1000 UNITS TABS Take 2 tablets by mouth daily.       escitalopram (LEXAPRO) 20 MG tablet        lamoTRIgine (LAMICTAL) 150 MG tablet Take 150 mg by mouth       lamoTRIgine (LAMICTAL) 25 MG tablet Take 50 mg by mouth       loperamide (IMODIUM) 2 MG capsule Take 2 mg by mouth 4 times daily as needed for diarrhea       MetFORmin (GLUCOPHAGE-XR) 750 MG 24 hr tablet Take 750 mg by mouth 2 times daily  (before meals).       omeprazole (PRILOSEC) 20 MG DR capsule Take 20 mg by mouth daily       oxybutynin ER (DITROPAN-XL) 10 MG 24 hr tablet Take 10 mg by mouth       TOPAMAX 200 MG OR TABS Take 1 tablet by mouth daily.       topiramate (TOPAMAX) 100 MG tablet Take 100 mg by mouth       traZODone (DESYREL) 100 MG tablet TK 1 T PO HS  2     ziprasidone (GEODON) 40 MG capsule TK 1 C PO BID WITH MEALS  2     ARIPiprazole (ABILIFY) 15 MG tablet Take 15 mg by mouth daily.       benztropine (COGENTIN) 0.5 MG tablet Take 0.5 mg by mouth 2 times daily.       GLYCOLAX OR POWD as needed       ketoconazole (NIZORAL) 2 % shampoo Apply to the affected area and wash off after 5 minutes. (Patient not taking: Reported on 3/20/2019) 120 mL 0     ondansetron (ZOFRAN) 4 MG tablet Take 4 mg by mouth       Social History     Tobacco Use     Smoking status: Former Smoker     Last attempt to quit: 2006     Years since quittin.8     Smokeless tobacco: Never Used   Substance Use Topics     Alcohol use: Yes     Comment: rare use       OBJECTIVE  BP (!) 161/95 (BP Location: Left arm, Patient Position: Chair, Cuff Size: Adult Large)   Pulse 115   Temp 98.5  F (36.9  C) (Oral)   Resp 19   SpO2 94%     Physical Exam   Constitutional: He appears well-developed and well-nourished.   HENT:   Right Ear: External ear normal.   Left Ear: External ear normal.   Nose: Nose normal.   Mouth/Throat: Oropharynx is clear and moist.   Neck: Normal range of motion. Neck supple.   Cardiovascular: Normal rate and regular rhythm.   Pulmonary/Chest: Effort normal and breath sounds normal. No stridor. No respiratory distress. He has no wheezes.   Lymphadenopathy:     He has no cervical adenopathy.       Labs:  Results for orders placed or performed in visit on 19 (from the past 24 hour(s))   Influenza A/B antigen   Result Value Ref Range    Influenza A/B Agn Specimen Nasal     Influenza A Negative NEG^Negative    Influenza B Negative NEG^Negative    Strep, Rapid Screen   Result Value Ref Range    Specimen Description Throat     Rapid Strep A Screen       NEGATIVE: No Group A streptococcal antigen detected by immunoassay, await culture report.       X-Ray was not done.    ASSESSMENT:      ICD-10-CM    1. Viral upper respiratory tract infection J06.9    2. Throat pain R07.0 Strep, Rapid Screen   3. Fever R50.9 Influenza A/B antigen        Medical Decision Making:    Differential Diagnosis:  URI Adult/Peds:  Viral upper respiratory illness    Serious Comorbid Conditions:  Adult:  None    PLAN:    URI Adult:  Tylenol, Ibuprofen, Fluids, Rest and OTC cough suppressant/expectorant    Followup:    If not improving or if condition worsens, follow up with your Primary Care Provider    Patient Instructions     Patient Education     Viral Upper Respiratory Illness (Adult)  You have a viral upper respiratory illness (URI), which is another term for the common cold. This illness is contagious during the first few days. It is spread through the air by coughing and sneezing. It may also be spread by direct contact (touching the sick person and then touching your own eyes, nose, or mouth). Frequent handwashing will decrease risk of spread. Most viral illnesses go away within 7 to 10 days with rest and simple home remedies. Sometimes the illness may last for several weeks. Antibiotics will not kill a virus, and they are generally not prescribed for this condition.    Home care    If symptoms are severe, rest at home for the first 2 to 3 days. When you resume activity, don't let yourself get too tired.    Don't smoke. If you need help stopping, talk with your healthcare provider.    Avoid being exposed to cigarette smoke (yours or others ).    You may use acetaminophen or ibuprofen to control pain and fever, unless another medicine was prescribed. If you have chronic liver or kidney disease, have ever had a stomach ulcer or gastrointestinal bleeding, or are taking  blood-thinning medicines, talk with your healthcare provider before using these medicines. Aspirin should never be given to anyone under 18 years of age who is ill with a viral infection or fever. It may cause severe liver or brain damage.    Your appetite may be poor, so a light diet is fine. Stay well hydrated by drinking 6 to 8 glasses of fluids per day (water, soft drinks, juices, tea, or soup). Extra fluids will help loosen secretions in the nose and lungs.    Over-the-counter cold medicines will not shorten the length of time you re sick, but they may be helpful for the following symptoms: cough, sore throat, and nasal and sinus congestion. If you take prescription medicines, ask your healthcare provider or pharmacist which over-the-counter medicines are safe to use. (Note: Don't use decongestants if you have high blood pressure.)  Follow-up care  Follow up with your healthcare provider, or as advised.  When to seek medical advice  Call your healthcare provider right away if any of these occur:    Cough with lots of colored sputum (mucus)    Severe headache; face, neck, or ear pain    Difficulty swallowing due to throat pain    Fever of 100.4 F (38 C) or higher, or as directed by your healthcare provider  Call 911  Call 911 if any of these occur:    Chest pain, shortness of breath, wheezing, or difficulty breathing    Coughing up blood    Very severe pain with swallowing, especially if it goes along with a muffled voice   Date Last Reviewed: 6/1/2018 2000-2018 [employer]. [address] [place] [address], [place] [address]. All rights reserved. This information is not intended as a substitute for professional medical care. Always follow your healthcare professional's instructions.

## 2019-03-21 NOTE — PATIENT INSTRUCTIONS

## 2019-04-01 ENCOUNTER — NURSE TRIAGE (OUTPATIENT)
Dept: NURSING | Facility: CLINIC | Age: 34
End: 2019-04-01

## 2019-04-02 NOTE — TELEPHONE ENCOUNTER
Rob called reporting he is having severe pain in his tailbone.  He has experiencing mild tailbone area pain for a couple months.     He reports that it has worsened over the past few days and that today it is much worse.    He states it is hurting very badly and affects right hip as well.  He has not taken any medication for it. He has not applied cold or heat.    He is currently living in a group home.    Per protocol, advised to be seen within 24 hours.  Patient reports he might choose to go in to ER.  Care Advice reviewed with patient.    Silva Hilario RN  [facility]          Reason for Disposition    [1] High-risk adult (e.g., age > 60, osteoporosis, chronic steroid use) AND [2] still hurts    Additional Information    Negative: Dangerous mechanism of injury (e.g., fall > 10 feet or 3 meters, trampoline)(Exception: pain began > 1 hour after injury)    Negative: Sounds like a life-threatening emergency to the triager    Negative: Can't walk or very difficult to walk    Negative: [1] Unable to urinate (or only a few drops) > 4 hours AND     [2] bladder feels very full (e.g., palpable bladder or strong urge to urinate)    Negative: [1] Urinary incontinence (i.e., loss of bladder control) AND [2] new onset    Negative: Numbness (loss of sensation) in groin or rectal area    Negative: Blood in stool    Negative: Blood in urine (red, pink, or tea-colored)    Negative: Weakness of a leg or foot (e.g., unable to bear weight, dragging foot)    Negative: Numbness in a leg or foot (i.e., loss of sensation)    Negative: [1] SEVERE pain AND [2] not improved 2 hours after pain medicine/ice packs    Negative: Pain radiates into the thigh or further down the leg now    Protocols used: TAILBONE INJURY-ADULT-

## 2019-05-26 ENCOUNTER — OFFICE VISIT (OUTPATIENT)
Dept: URGENT CARE | Facility: URGENT CARE | Age: 34
End: 2019-05-26
Payer: MEDICARE

## 2019-05-26 VITALS
BODY MASS INDEX: 39.24 KG/M2 | HEART RATE: 114 BPM | DIASTOLIC BLOOD PRESSURE: 104 MMHG | TEMPERATURE: 98.6 F | WEIGHT: 228.6 LBS | SYSTOLIC BLOOD PRESSURE: 163 MMHG | RESPIRATION RATE: 24 BRPM | OXYGEN SATURATION: 94 %

## 2019-05-26 DIAGNOSIS — E11.9 TYPE 2 DIABETES MELLITUS WITHOUT COMPLICATION, WITHOUT LONG-TERM CURRENT USE OF INSULIN (H): ICD-10-CM

## 2019-05-26 DIAGNOSIS — R07.9 CHEST PAIN, UNSPECIFIED TYPE: Primary | ICD-10-CM

## 2019-05-26 PROBLEM — R46.89 AGGRESSIVE BEHAVIOR: Status: ACTIVE | Noted: 2018-07-14

## 2019-05-26 PROBLEM — F79 INTELLECTUAL DISABILITY: Status: ACTIVE | Noted: 2018-08-01

## 2019-05-26 PROBLEM — F63.81 INTERMITTENT EXPLOSIVE DISORDER: Status: ACTIVE | Noted: 2018-07-02

## 2019-05-26 PROCEDURE — 93000 ELECTROCARDIOGRAM COMPLETE: CPT | Performed by: PHYSICIAN ASSISTANT

## 2019-05-26 PROCEDURE — 99215 OFFICE O/P EST HI 40 MIN: CPT | Performed by: PHYSICIAN ASSISTANT

## 2019-05-26 RX ORDER — ASPIRIN 81 MG/1
324 TABLET, CHEWABLE ORAL ONCE
Status: COMPLETED | OUTPATIENT
Start: 2019-05-26 | End: 2019-05-26

## 2019-05-26 RX ADMIN — ASPIRIN 324 MG: 81 TABLET, CHEWABLE ORAL at 12:45

## 2019-05-26 ASSESSMENT — ENCOUNTER SYMPTOMS
HEMATURIA: 0
EYE REDNESS: 0
DYSURIA: 0
VOMITING: 0
GASTROINTESTINAL NEGATIVE: 1
MYALGIAS: 0
EYES NEGATIVE: 1
PALPITATIONS: 0
WEAKNESS: 0
ADENOPATHY: 0
CHILLS: 0
SHORTNESS OF BREATH: 0
FEVER: 0
CONSTITUTIONAL NEGATIVE: 1
POLYDIPSIA: 0
DIZZINESS: 0
WHEEZING: 0
FREQUENCY: 0
HEADACHES: 1
ABDOMINAL PAIN: 0
LIGHT-HEADEDNESS: 0
NAUSEA: 0
EYE DISCHARGE: 0
DIARRHEA: 0
COUGH: 0
EYE ITCHING: 0
MUSCULOSKELETAL NEGATIVE: 1
DIAPHORESIS: 0
RHINORRHEA: 0
RESPIRATORY NEGATIVE: 1
SORE THROAT: 0
CHEST TIGHTNESS: 0
ENDOCRINE NEGATIVE: 1

## 2019-05-26 ASSESSMENT — PAIN SCALES - GENERAL: PAINLEVEL: SEVERE PAIN (7)

## 2019-05-26 NOTE — PROGRESS NOTES
"Chief Complaint:    Chief Complaint   Patient presents with     Chest Pain     started 45-50 mins. ago      Headache     started about 30 mins. ago        HPI: Rob Brady is an 33 year old male who presents for evaluation and treatment of chest pain and headache. The chest pain started roughly 1 hour ago and has been worsening.  The pain is more on the L side.  The pain is sharp in nature and does not radiate.  The pain gets worse with activity.  He has been seen for chest pain several times in the past.  Workup has been negative.  He denies any dizzines, fever, nausea or vomiting.  No recent illness, or cough.  No hemoptysis.    Patient has a complicated medical Hx \"see problem list below.\"    ROS:      Review of Systems   Constitutional: Negative.  Negative for chills, diaphoresis and fever.   HENT: Negative.  Negative for congestion, ear pain, rhinorrhea and sore throat.    Eyes: Negative.  Negative for discharge, redness and itching.   Respiratory: Negative.  Negative for cough, chest tightness, shortness of breath and wheezing.    Cardiovascular: Positive for chest pain. Negative for palpitations.   Gastrointestinal: Negative.  Negative for abdominal pain, diarrhea, nausea and vomiting.   Endocrine: Negative.  Negative for polydipsia and polyuria.   Genitourinary: Negative for dysuria, frequency, hematuria and urgency.   Musculoskeletal: Negative.  Negative for myalgias.   Skin: Negative for rash.   Allergic/Immunologic: Negative for immunocompromised state.   Neurological: Positive for headaches. Negative for dizziness, weakness and light-headedness.   Hematological: Negative for adenopathy.        Family History   History reviewed. No pertinent family history.    Social History  Social History     Socioeconomic History     Marital status: Single     Spouse name: Not on file     Number of children: Not on file     Years of education: Not on file     Highest education level: Not on file   Occupational History "     Not on file   Social Needs     Financial resource strain: Not on file     Food insecurity:     Worry: Not on file     Inability: Not on file     Transportation needs:     Medical: Not on file     Non-medical: Not on file   Tobacco Use     Smoking status: Former Smoker     Last attempt to quit: 2006     Years since quittin.0     Smokeless tobacco: Never Used   Substance and Sexual Activity     Alcohol use: Yes     Comment: rare use     Drug use: No     Sexual activity: Never   Lifestyle     Physical activity:     Days per week: Not on file     Minutes per session: Not on file     Stress: Not on file   Relationships     Social connections:     Talks on phone: Not on file     Gets together: Not on file     Attends Worship service: Not on file     Active member of club or organization: Not on file     Attends meetings of clubs or organizations: Not on file     Relationship status: Not on file     Intimate partner violence:     Fear of current or ex partner: Not on file     Emotionally abused: Not on file     Physically abused: Not on file     Forced sexual activity: Not on file   Other Topics Concern     Parent/sibling w/ CABG, MI or angioplasty before 65F 55M? Not Asked   Social History Narrative     Not on file        Surgical History:  Past Surgical History:   Procedure Laterality Date     BIOPSY       DENTAL SURGERY  2006        Problem List:  Patient Active Problem List   Diagnosis     Depression     Aggressive behavior     Intellectual disability     Intermittent explosive disorder     Lumbago     Major depressive disorder, recurrent episode, in partial or unspecified remission     Obesity, diabetes, and hypertension syndrome (H)     Type 2 diabetes mellitus without complication, without long-term current use of insulin (H)     Sleep apnea     LEROY (obstructive sleep apnea)     Bipolar I disorder (H)        Allergies:  Allergies   Allergen Reactions     Nkda [No Known Drug Allergies] Anaphylaxis         Current Meds:    Current Outpatient Medications:      acetaminophen (TYLENOL) 500 MG tablet, Take 500-1,000 mg by mouth every 6 hours as needed for mild pain, Disp: , Rfl:      ACT FLUORIDE MT, Take  by mouth 2 times daily. Rinse Mouth Twice Daily., Disp: , Rfl:      albuterol (PROVENTIL HFA) 108 (90 Base) MCG/ACT inhaler, Inhale 2 puffs into the lungs, Disp: , Rfl:      ARIPiprazole (ABILIFY) 15 MG tablet, Take 15 mg by mouth daily., Disp: , Rfl:      benztropine (COGENTIN) 0.5 MG tablet, Take 0.5 mg by mouth 2 times daily., Disp: , Rfl:      cholecalciferol (VITAMIN D-1000 MAX ST) 1000 units TABS, Take 2,000 Units by mouth, Disp: , Rfl:      cholecalciferol 1000 UNITS TABS, Take 2 tablets by mouth daily., Disp: , Rfl:      escitalopram (LEXAPRO) 20 MG tablet, , Disp: , Rfl:      GLYCOLAX OR POWD, as needed, Disp: , Rfl:      lamoTRIgine (LAMICTAL) 150 MG tablet, Take 150 mg by mouth, Disp: , Rfl:      lamoTRIgine (LAMICTAL) 25 MG tablet, Take 50 mg by mouth, Disp: , Rfl:      loperamide (IMODIUM) 2 MG capsule, Take 2 mg by mouth 4 times daily as needed for diarrhea, Disp: , Rfl:      MetFORmin (GLUCOPHAGE-XR) 750 MG 24 hr tablet, Take 750 mg by mouth 2 times daily (before meals)., Disp: , Rfl:      omeprazole (PRILOSEC) 20 MG DR capsule, Take 20 mg by mouth daily, Disp: , Rfl:      ondansetron (ZOFRAN) 4 MG tablet, Take 4 mg by mouth, Disp: , Rfl:      oxybutynin ER (DITROPAN-XL) 10 MG 24 hr tablet, Take 10 mg by mouth, Disp: , Rfl:      TOPAMAX 200 MG OR TABS, Take 1 tablet by mouth daily., Disp: , Rfl:      topiramate (TOPAMAX) 100 MG tablet, Take 100 mg by mouth, Disp: , Rfl:      ziprasidone (GEODON) 40 MG capsule, TK 1 C PO BID WITH MEALS, Disp: , Rfl: 2     ketoconazole (NIZORAL) 2 % shampoo, Apply to the affected area and wash off after 5 minutes. (Patient not taking: Reported on 3/20/2019), Disp: 120 mL, Rfl: 0     traZODone (DESYREL) 100 MG tablet, TK 1 T PO HS, Disp: , Rfl: 2    Current  Facility-Administered Medications:      aspirin (ASA) chewable tablet 324 mg, 324 mg, Oral, Once, Aidan Farmer PA-C     PHYSICAL EXAM:     Vital signs noted and reviewed by Aidan Farmer  BP (!) 163/104 (BP Location: Left arm, Patient Position: Sitting, Cuff Size: Adult Regular)   Pulse 114   Temp 98.6  F (37  C) (Oral)   Resp 24   Wt 103.7 kg (228 lb 9.6 oz)   SpO2 94%   BMI 39.24 kg/m       PEFR:    Physical Exam   Constitutional: He is oriented to person, place, and time. He appears well-developed and well-nourished. He is cooperative.  Non-toxic appearance. He does not have a sickly appearance. He appears ill. No distress.   HENT:   Head: Normocephalic and atraumatic.   Right Ear: Hearing, tympanic membrane, external ear and ear canal normal. Tympanic membrane is not perforated, not erythematous, not retracted and not bulging.   Left Ear: Hearing, tympanic membrane, external ear and ear canal normal. Tympanic membrane is not perforated, not erythematous, not retracted and not bulging.   Nose: No mucosal edema or rhinorrhea. Right sinus exhibits no maxillary sinus tenderness and no frontal sinus tenderness. Left sinus exhibits no maxillary sinus tenderness and no frontal sinus tenderness.   Mouth/Throat: Mucous membranes are normal. No oropharyngeal exudate, posterior oropharyngeal edema or posterior oropharyngeal erythema. Tonsils are 0 on the right. Tonsils are 0 on the left. No tonsillar exudate.   Eyes: Pupils are equal, round, and reactive to light. Conjunctivae, EOM and lids are normal. Right eye exhibits no discharge and no exudate. Left eye exhibits no discharge and no exudate. Right conjunctiva is not injected. Left conjunctiva is not injected.   Neck: Normal range of motion. Neck supple.   Cardiovascular: Normal rate, regular rhythm, normal heart sounds and intact distal pulses. Exam reveals no gallop and no friction rub.   No murmur heard.  Pulmonary/Chest: Effort normal and breath  sounds normal. No stridor. No respiratory distress. He has no decreased breath sounds. He has no wheezes. He has no rhonchi. He has no rales. He exhibits no tenderness.   Abdominal: Soft. Bowel sounds are normal. He exhibits no distension and no mass. There is no tenderness. There is no rigidity, no rebound, no guarding and no CVA tenderness.   Musculoskeletal: Normal range of motion.   Neurological: He is alert and oriented to person, place, and time. He displays normal reflexes. No cranial nerve deficit or sensory deficit. He exhibits normal muscle tone. Coordination normal.   Skin: Skin is warm. Capillary refill takes less than 2 seconds. He is not diaphoretic.   Psychiatric: He has a normal mood and affect. His behavior is normal. Judgment and thought content normal.        Labs:          EKG Interpretation:      Interpreted by Aidan Farmer  Time reviewed:12:28   Symptoms at time of EKG: chest pain and headache   Rhythm: Sinus tachycardia  Rate: 102  Axis: Normal  Ectopy: None  Conduction: Normal  ST Segments/ T Waves: No ST-T wave changes and No acute ischemic changes  Q Waves: None  Comparison to prior: Unchanged from 1/12/2019    Clinical Impression: sinus tachycardia        Medical Decision Making:    Differential Diagnosis:  Cardiac: Acute MI  Chest wall Pain  Panic/Anxiety      ASSESSMENT:     1. Chest pain, unspecified type    2. Type 2 diabetes mellitus without complication, without long-term current use of insulin (H)           PLAN:     EKG was immediately obtained.  This was negative for any acute ST or ischemic changes.  Patient encouraged to continue to monitor blood sugars closely.  Last A1C not available.  Patient encouraged to follow up with his PCP in the next week for evaluation of his DM.  At this time, I cannot rule out cardiac cause of his chest pain.   Patient complains of worsening symptoms.  He was given 4 baby aspirin PO in clinic today.   EMS called.  Handoff performed by me with  paramedics.  Patient verbalized understanding and agreed with this plan.  Patient was discharged to EMS in stable condition.     Aidan Farmer  5/26/2019, 12:13 PM

## 2020-08-17 ENCOUNTER — NURSE TRIAGE (OUTPATIENT)
Dept: NURSING | Facility: CLINIC | Age: 35
End: 2020-08-17

## 2020-08-17 NOTE — TELEPHONE ENCOUNTER
Caller is complaining of upper abdominal pain. Caller rates pain 7/10 tylenol taken without any relief. Triage guidelines recommend to go to ED. Caller verbalized and understands directives.    Reason for Disposition    [1] SEVERE pain (e.g., excruciating) AND [2] present > 1 hour    Additional Information    Negative: Severe difficulty breathing (e.g., struggling for each breath, speaks in single words)    Negative: Shock suspected (e.g., cold/pale/clammy skin, too weak to stand, low BP, rapid pulse)    Negative: Difficult to awaken or acting confused (e.g., disoriented, slurred speech)    Negative: Passed out (i.e., lost consciousness, collapsed and was not responding)    Negative: Visible sweat on face or sweat dripping down face    Negative: Sounds like a life-threatening emergency to the triager    Negative: Followed an abdomen (stomach) injury    Negative: Chest pain    Protocols used: ABDOMINAL PAIN - UPPER-A-AH

## 2021-11-23 NOTE — ED TRIAGE NOTES
33-year-old male presents to the ER with complaints of left sided chest pain that started suddenly while playing bingo with his mom. Pt states this has happened before but has not been seen for it. [place] like his heart was going fast. Pt is now having a headache after getting nitroglycerin from EMS. Pt was also given 4 baby asa.   
Dr. De León (Cardiology)
Dr. Mathias
Dr. Washington (Neurology)